# Patient Record
Sex: MALE | URBAN - METROPOLITAN AREA
[De-identification: names, ages, dates, MRNs, and addresses within clinical notes are randomized per-mention and may not be internally consistent; named-entity substitution may affect disease eponyms.]

---

## 2018-01-17 ENCOUNTER — APPOINTMENT (RX ONLY)
Dept: URBAN - METROPOLITAN AREA CLINIC 74 | Facility: CLINIC | Age: 38
Setting detail: DERMATOLOGY
End: 2018-01-17

## 2018-01-17 DIAGNOSIS — L72.8 OTHER FOLLICULAR CYSTS OF THE SKIN AND SUBCUTANEOUS TISSUE: ICD-10-CM

## 2018-01-17 PROCEDURE — ? PRESCRIPTION

## 2018-01-17 PROCEDURE — ? COUNSELING

## 2018-01-17 PROCEDURE — ? TREATMENT REGIMEN

## 2018-01-17 PROCEDURE — ? INTRALESIONAL KENALOG

## 2018-01-17 PROCEDURE — 11900 INJECT SKIN LESIONS </W 7: CPT

## 2018-01-17 RX ORDER — SULFAMETHOXAZOLE AND TRIMETHOPRIM 800; 160 MG/1; MG/1
TABLET ORAL
Qty: 28 | Refills: 0 | Status: ERX | COMMUNITY
Start: 2018-01-17

## 2018-01-17 RX ADMIN — SULFAMETHOXAZOLE AND TRIMETHOPRIM: 800; 160 TABLET ORAL at 00:00

## 2018-01-17 ASSESSMENT — LOCATION DETAILED DESCRIPTION DERM: LOCATION DETAILED: LEFT INFERIOR MEDIAL LOWER BACK

## 2018-01-17 ASSESSMENT — LOCATION ZONE DERM: LOCATION ZONE: TRUNK

## 2018-01-17 ASSESSMENT — LOCATION SIMPLE DESCRIPTION DERM: LOCATION SIMPLE: LEFT LOWER BACK

## 2018-01-17 NOTE — PROCEDURE: INTRALESIONAL KENALOG
Concentration Of Solution Injected (Mg/Ml): 20.0
Include Z78.9 (Other Specified Conditions Influencing Health Status) As An Associated Diagnosis?: No
Detail Level: Detailed
Consent: The risks of atrophy were reviewed with the patient.
X Size Of Lesion In Cm (Optional): 0
Total Volume Injected (Ccs- Only Use Numbers And Decimals): .3
Kenalog Preparation: Kenalog
Medical Necessity Clause: This procedure was medically necessary because the lesions that were treated were:

## 2018-02-08 ENCOUNTER — APPOINTMENT (RX ONLY)
Dept: URBAN - METROPOLITAN AREA CLINIC 74 | Facility: CLINIC | Age: 38
Setting detail: DERMATOLOGY
End: 2018-02-08

## 2018-02-08 DIAGNOSIS — L72.8 OTHER FOLLICULAR CYSTS OF THE SKIN AND SUBCUTANEOUS TISSUE: ICD-10-CM | Status: RESOLVING

## 2018-02-08 PROCEDURE — ? PRESCRIPTION

## 2018-02-08 PROCEDURE — 11900 INJECT SKIN LESIONS </W 7: CPT

## 2018-02-08 PROCEDURE — ? OTHER

## 2018-02-08 PROCEDURE — ? COUNSELING

## 2018-02-08 PROCEDURE — ? TREATMENT REGIMEN

## 2018-02-08 PROCEDURE — ? INTRALESIONAL KENALOG

## 2018-02-08 RX ORDER — CLINDAMYCIN PHOSPHATE 10 MG/ML
SOLUTION TOPICAL
Qty: 1 | Refills: 1 | Status: ERX | COMMUNITY
Start: 2018-02-08

## 2018-02-08 RX ADMIN — CLINDAMYCIN PHOSPHATE: 10 SOLUTION TOPICAL at 00:00

## 2018-02-08 ASSESSMENT — LOCATION SIMPLE DESCRIPTION DERM: LOCATION SIMPLE: LEFT BUTTOCK

## 2018-02-08 ASSESSMENT — LOCATION ZONE DERM: LOCATION ZONE: TRUNK

## 2018-02-08 ASSESSMENT — LOCATION DETAILED DESCRIPTION DERM: LOCATION DETAILED: LEFT MEDIAL BUTTOCK

## 2018-02-08 NOTE — HPI: CYST
How Severe Is Your Cyst?: moderate
Is This A New Presentation, Or A Follow-Up?: Follow Up Cyst
Additional History: Pt stated that the cyst shrunk with the injection of ILK 20 and pt believes it helped the most. Pt was also on bactrim but he felt as if those abx did not help. Pt reports of just yesterday, the cyst enlarged and flared up.

## 2018-02-08 NOTE — PROCEDURE: INTRALESIONAL KENALOG
Kenalog Preparation: Kenalog
X Size Of Lesion In Cm (Optional): 0
Detail Level: Detailed
Total Volume Injected (Ccs- Only Use Numbers And Decimals): 0.4
Concentration Of Solution Injected (Mg/Ml): 10.0
Administered By (Optional): SANJUANITA
Treatment Number (Optional): 2
Include Z78.9 (Other Specified Conditions Influencing Health Status) As An Associated Diagnosis?: No
Consent: The risks of atrophy were reviewed with the patient.
Medical Necessity Clause: This procedure was medically necessary because the lesions that were treated were:

## 2018-02-08 NOTE — PROCEDURE: OTHER
Other (Free Text): Has acneiform papules on buttocks.  Discussed using clinda wipes twice daily and panoxyl wash daily.
Note Text (......Xxx Chief Complaint.): This diagnosis correlates with the
Detail Level: Zone
Other (Free Text): Due to location may need general surgery to excise cyst once healed and not inflamed if still present.

## 2018-02-08 NOTE — PROCEDURE: TREATMENT REGIMEN
Detail Level: Zone
Initiate Treatment: Clindamycin wipes use on affected area once a day
Samples Given: Doryx 200mg take 1/2 tab once a day x 12 days
Otc Regimen: Panoxyl wash use when washing areas.

## 2018-02-23 ENCOUNTER — APPOINTMENT (RX ONLY)
Dept: URBAN - METROPOLITAN AREA CLINIC 73 | Facility: CLINIC | Age: 38
Setting detail: DERMATOLOGY
End: 2018-02-23

## 2018-02-23 DIAGNOSIS — L72.8 OTHER FOLLICULAR CYSTS OF THE SKIN AND SUBCUTANEOUS TISSUE: ICD-10-CM | Status: IMPROVED

## 2018-02-23 PROCEDURE — ? TREATMENT REGIMEN

## 2018-02-23 PROCEDURE — ? OTHER

## 2018-02-23 PROCEDURE — ? COUNSELING

## 2018-02-23 PROCEDURE — ? INTRALESIONAL KENALOG

## 2018-02-23 PROCEDURE — 11900 INJECT SKIN LESIONS </W 7: CPT

## 2018-02-23 ASSESSMENT — LOCATION SIMPLE DESCRIPTION DERM: LOCATION SIMPLE: LEFT BUTTOCK

## 2018-02-23 ASSESSMENT — LOCATION DETAILED DESCRIPTION DERM: LOCATION DETAILED: LEFT MEDIAL BUTTOCK

## 2018-02-23 ASSESSMENT — LOCATION ZONE DERM: LOCATION ZONE: TRUNK

## 2018-02-23 NOTE — PROCEDURE: OTHER
Detail Level: Zone
Note Text (......Xxx Chief Complaint.): This diagnosis correlates with the
Other (Free Text): Has acneiform papules on buttocks.  Discussed using clinda wipes twice daily and panoxyl wash daily.
Other (Free Text): Due to location may need general surgery to excise cyst once healed and not inflamed if still present. Rec Pt to [facility]

## 2018-02-23 NOTE — PROCEDURE: INTRALESIONAL KENALOG
Size Of Lesion (Optional): 4
Consent: The risks of atrophy were reviewed with the patient.
Detail Level: Detailed
Kenalog Preparation: Kenalog
Treatment Number (Optional): 3
Total Volume Injected (Ccs- Only Use Numbers And Decimals): 0.4
Concentration Of Solution Injected (Mg/Ml): 20.0
Medical Necessity Clause: This procedure was medically necessary because the lesions that were treated were:
Include Z78.9 (Other Specified Conditions Influencing Health Status) As An Associated Diagnosis?: No
Administered By (Optional): SANJUANITA

## 2018-02-23 NOTE — PROCEDURE: TREATMENT REGIMEN
Detail Level: Zone
Continue Regimen: Clindamycin wipes use on affected area once a day
Otc Regimen: Panoxyl wash use when washing areas.

## 2018-05-09 ENCOUNTER — APPOINTMENT (RX ONLY)
Dept: URBAN - METROPOLITAN AREA CLINIC 73 | Facility: CLINIC | Age: 38
Setting detail: DERMATOLOGY
End: 2018-05-09

## 2018-05-09 DIAGNOSIS — L05.91 PILONIDAL CYST WITHOUT ABSCESS: ICD-10-CM | Status: INADEQUATELY CONTROLLED

## 2018-05-09 PROCEDURE — ? INTRALESIONAL KENALOG

## 2018-05-09 PROCEDURE — 11900 INJECT SKIN LESIONS </W 7: CPT

## 2018-05-09 PROCEDURE — ? COUNSELING

## 2018-05-09 PROCEDURE — ? TREATMENT REGIMEN

## 2018-05-09 PROCEDURE — ? OTHER

## 2018-05-09 PROCEDURE — ? ORDER TESTS

## 2018-05-09 ASSESSMENT — LOCATION SIMPLE DESCRIPTION DERM
LOCATION SIMPLE: LOWER BACK
LOCATION SIMPLE: LEFT BUTTOCK

## 2018-05-09 ASSESSMENT — LOCATION DETAILED DESCRIPTION DERM
LOCATION DETAILED: INFERIOR LUMBAR SPINE
LOCATION DETAILED: LEFT MEDIAL BUTTOCK

## 2018-05-09 ASSESSMENT — LOCATION ZONE DERM: LOCATION ZONE: TRUNK

## 2018-05-09 NOTE — PROCEDURE: OTHER
Note Text (......Xxx Chief Complaint.): This diagnosis correlates with the
Other (Free Text): ->d/w pt further treatment options including taking doxycycline or minocycline and ILK injections\\n->Pt weighs 215lbs\\n->patient will consider a complete cyst removal from [facility] - consulted with them and they recommended surgical excision of pilonidal cyst\\n->informed patient cyst appears to have moved location
Detail Level: Zone

## 2018-05-09 NOTE — HPI: CYST
How Severe Is Your Cyst?: moderate
Is This A New Presentation, Or A Follow-Up?: Follow Up Cyst
Additional History: Pt still concerned about cyst in his buttock area. He went to [facility] to discuss removal but he opted to get cyst drained instead around April. He claims that the cyst is doing better but still recurrent

## 2018-05-09 NOTE — PROCEDURE: INTRALESIONAL KENALOG
Detail Level: Detailed
Kenalog Preparation: Kenalog
X Size Of Lesion In Cm (Optional): 0
Include Z78.9 (Other Specified Conditions Influencing Health Status) As An Associated Diagnosis?: No
Concentration Of Solution Injected (Mg/Ml): 20.0
Consent: The risks of atrophy were reviewed with the patient.
Administered By (Optional): SANJUANITA
Total Volume Injected (Ccs- Only Use Numbers And Decimals): 0.3
Medical Necessity Clause: This procedure was medically necessary because the lesions that were treated were:

## 2018-05-09 NOTE — PROCEDURE: TREATMENT REGIMEN
Initiate Treatment: Solodyn 105mg take one capsule daily x 2 weeks
Detail Level: Zone
Samples Given: Solodyn 105mg

## 2018-06-12 ENCOUNTER — APPOINTMENT (RX ONLY)
Dept: URBAN - METROPOLITAN AREA CLINIC 74 | Facility: CLINIC | Age: 38
Setting detail: DERMATOLOGY
End: 2018-06-12

## 2018-06-12 DIAGNOSIS — L05.91 PILONIDAL CYST WITHOUT ABSCESS: ICD-10-CM | Status: IMPROVED

## 2018-06-12 PROCEDURE — ? COUNSELING

## 2018-06-12 PROCEDURE — 11900 INJECT SKIN LESIONS </W 7: CPT

## 2018-06-12 PROCEDURE — ? INTRALESIONAL KENALOG

## 2018-06-12 PROCEDURE — ? PATIENT SPECIFIC COUNSELING

## 2018-06-12 ASSESSMENT — LOCATION SIMPLE DESCRIPTION DERM: LOCATION SIMPLE: LOWER BACK

## 2018-06-12 ASSESSMENT — LOCATION DETAILED DESCRIPTION DERM: LOCATION DETAILED: INFERIOR LUMBAR SPINE

## 2018-06-12 ASSESSMENT — LOCATION ZONE DERM: LOCATION ZONE: TRUNK

## 2018-06-12 NOTE — HPI: CYST
How Severe Is Your Cyst?: moderate
Is This A New Presentation, Or A Follow-Up?: Follow Up Cyst
Additional History: Pt states cyst improved with last ILK and oral abx course, it is now flaring up and would like another ILK. Pt has visited with general surgeon for removal of cyst but states he could not do at this time due to job and would like to keep up with ILK

## 2018-06-12 NOTE — PROCEDURE: INTRALESIONAL KENALOG
Detail Level: Detailed
Total Volume Injected (Ccs- Only Use Numbers And Decimals): .25
Kenalog Preparation: Kenalog
Consent: The risks of atrophy were reviewed with the patient.
Include Z78.9 (Other Specified Conditions Influencing Health Status) As An Associated Diagnosis?: No
X Size Of Lesion In Cm (Optional): 0
Administered By (Optional): Elvira Farley PA-C
Concentration Of Solution Injected (Mg/Ml): 10.0
Medical Necessity Clause: This procedure was medically necessary because the lesions that were treated were:

## 2020-06-01 NOTE — PROCEDURE: COUNSELING
Detail Level: Detailed
[FreeTextEntry1] : was going for cardiac rehab and stopped when closed for COVIDS\par no cp,sob